# Patient Record
Sex: MALE | Race: BLACK OR AFRICAN AMERICAN | NOT HISPANIC OR LATINO | Employment: FULL TIME | URBAN - METROPOLITAN AREA
[De-identification: names, ages, dates, MRNs, and addresses within clinical notes are randomized per-mention and may not be internally consistent; named-entity substitution may affect disease eponyms.]

---

## 2022-11-21 PROCEDURE — 250N000013 HC RX MED GY IP 250 OP 250 PS 637: Performed by: EMERGENCY MEDICINE

## 2022-11-21 PROCEDURE — 99283 EMERGENCY DEPT VISIT LOW MDM: CPT

## 2022-11-21 RX ORDER — IBUPROFEN 600 MG/1
600 TABLET, FILM COATED ORAL ONCE
Status: COMPLETED | OUTPATIENT
Start: 2022-11-22 | End: 2022-11-21

## 2022-11-21 RX ADMIN — IBUPROFEN 600 MG: 600 TABLET ORAL at 23:59

## 2022-11-22 ENCOUNTER — HOSPITAL ENCOUNTER (EMERGENCY)
Facility: CLINIC | Age: 32
Discharge: HOME | End: 2022-11-22
Attending: EMERGENCY MEDICINE | Admitting: EMERGENCY MEDICINE
Payer: OTHER MISCELLANEOUS

## 2022-11-22 ENCOUNTER — APPOINTMENT (OUTPATIENT)
Dept: RADIOLOGY | Facility: CLINIC | Age: 32
End: 2022-11-22
Attending: EMERGENCY MEDICINE

## 2022-11-22 VITALS
SYSTOLIC BLOOD PRESSURE: 135 MMHG | TEMPERATURE: 98.5 F | OXYGEN SATURATION: 100 % | RESPIRATION RATE: 16 BRPM | HEART RATE: 70 BPM | DIASTOLIC BLOOD PRESSURE: 89 MMHG

## 2022-11-22 DIAGNOSIS — S80.12XA CONTUSION OF LEFT LOWER EXTREMITY, INITIAL ENCOUNTER: ICD-10-CM

## 2022-11-22 DIAGNOSIS — S84.92XA NEURAPRAXIA OF LEFT LOWER EXTREMITY, INITIAL ENCOUNTER: ICD-10-CM

## 2022-11-22 DIAGNOSIS — S80.812A ABRASION OF LEFT LEG, INITIAL ENCOUNTER: ICD-10-CM

## 2022-11-22 PROCEDURE — 73562 X-RAY EXAM OF KNEE 3: CPT | Mod: LT

## 2022-11-22 ASSESSMENT — ENCOUNTER SYMPTOMS: WOUND: 1

## 2022-11-22 ASSESSMENT — ACTIVITIES OF DAILY LIVING (ADL): ADLS_ACUITY_SCORE: 35

## 2022-11-22 NOTE — ED PROVIDER NOTES
EMERGENCY DEPARTMENT ENCOUNTER      NAME: Osman Davis  AGE: 32 year old male  YOB: 1990  MRN: [id number]  EVALUATION DATE & TIME: No admission date for patient encounter.    PCP: No Ref-Primary, Physician    ED PROVIDER: Toño Ames M.D.      Chief Complaint   Patient presents with     Leg Injury         FINAL IMPRESSION:  1. Abrasion of left leg, initial encounter    2. Contusion of left lower extremity, initial encounter    3. Neurapraxia of left lower extremity, initial encounter          ED COURSE & MEDICAL DECISION MAKING:    Pertinent Labs & Imaging studies reviewed. (See chart for details)  32 year old male presents to the Emergency Department for evaluation of left leg injury.  He had a large window fall onto his leg.  It was not broken and was not cut by the glass.  He has a small abrasion over his left lateral leg.  Wound is irrigated and cleaned.  Did do an x-ray.  No signs of fracture.  Compartments are soft.  No signs of compartment syndrome.  I suspect he has some degree of neuropraxia as he likely irritated the nerve there.  Discussed supportive care including Tylenol ibuprofen.  Did give him crutches at his request.  Will discharge home.    11:47 PM I met with the patient to gather history and to perform my initial exam. I discussed the plan for care while in the Emergency Department. PPE: surgical mask, goggles, gloves.  1:03 AM I updated the patient on X-ray results. We discussed plans for discharge including supportive cares, symptomatic treatment, outpatient follow up, and reasons to return to the emergency department.     At the conclusion of the encounter I discussed the results of all of the tests and the disposition. The questions were answered. The patient or family acknowledged understanding and was agreeable with the care plan.         MEDICATIONS GIVEN IN THE EMERGENCY:  Medications   ibuprofen (ADVIL/MOTRIN) tablet 600 mg (600 mg Oral Given 11/21/22 0957)       NEW  PRESCRIPTIONS STARTED AT TODAY'S ER VISIT  There are no discharge medications for this patient.         =================================================================    HPI    Patient information was obtained from: patient     Use of : N/A      Osman Davis is a 32 year old male with no pertinent history who presents to this ED by walk in for evaluation of leg injury.    Patient was at work when a window fell on him. The window was not broken when it fell. Reports left knee pain with a cut on the left lateral side. Has not been able to walk since the accident. Denies any other complaints.    Otherwise healthy.     REVIEW OF SYSTEMS   Review of Systems   Musculoskeletal:        Positive for left knee pain.   Skin: Positive for wound (left lateral knee).   All other systems reviewed and are negative.       PAST MEDICAL HISTORY:  History reviewed. No pertinent past medical history.    PAST SURGICAL HISTORY:  History reviewed. No pertinent surgical history.        CURRENT MEDICATIONS:    No current facility-administered medications for this encounter.     No current outpatient medications on file.         ALLERGIES:  No Known Allergies    FAMILY HISTORY:  No family history on file.    SOCIAL HISTORY:   Social History     Socioeconomic History     Marital status: Single     11/22/2022 Patient alone in the ED.    VITALS:  /89   Pulse 70   Temp 98.5  F (36.9  C) (Oral)   Resp 16   SpO2 100%     PHYSICAL EXAM    Physical Exam  Constitutional:       General: He is not in acute distress.     Appearance: He is not diaphoretic.   HENT:      Head: Atraumatic.      Mouth/Throat:      Mouth: Oropharynx is clear and moist.   Eyes:      General: No scleral icterus.     Pupils: Pupils are equal, round, and reactive to light.   Cardiovascular:      Pulses: Intact distal pulses.      Heart sounds: Normal heart sounds.   Pulmonary:      Effort: No respiratory distress.      Breath sounds: Normal breath sounds.    Abdominal:      General: Bowel sounds are normal.      Palpations: Abdomen is soft.      Tenderness: There is no abdominal tenderness.   Musculoskeletal:         General: Tenderness and signs of injury present. No edema.      Comments: Abrasion over left lateral shin just distal to the knee.  There is some muscular tenderness there.  Compartments are soft.  Pulses intact.  Some diminished sensation to light touch in the foot.  Cap refill less than 3 seconds.  Strength intact.  Able to ambulate though with some pain.  No bony tenderness.   Skin:     General: Skin is warm.      Findings: No rash.           RADIOLOGY:  Reviewed all pertinent imaging. Please see official radiology report.  XR Knee Left 3 Views   Final Result   IMPRESSION:    1. No visualized acute fracture or malalignment of the left knee.   2. No left knee joint effusion.           I, Terri Escalera, am serving as a scribe to document services personally performed by Dr. Toño Ames, based on my observation and the provider's statements to me. I, Toño Ames MD attest that Terri Escalera is acting in a scribe capacity, has observed my performance of the services and has documented them in accordance with my direction.    Toño Ames M.D.  Emergency Medicine  [facility] EMERGENCY ROOM  [address] [id number] [phone]  Dept: [phone]       Toño Ames MD  11/22/22 0133

## 2022-11-22 NOTE — Clinical Note
Osman Davis was seen and treated in our emergency department on 11/21/2022.  He may return to work on 11/25/2022.       If you have any questions or concerns, please don't hesitate to call.      Toño Ames MD

## 2022-11-22 NOTE — ED NOTES
"Patient reports he was at work when a window fell on his left lower leg . Scrape noted to left lateral leg near knee. ROM intact at toes, ankle, and knee. Patient reports \"numbness\" to LLE below knee  "

## 2022-11-22 NOTE — ED TRIAGE NOTES
Reports lle injury and lac approx 2 hrs ago  Reports was at work and window fell on leg  Reporting numbness/decreased sensation from knee to foot  Wound is wrapped, no active bleeding  Pedal pulses palp to lle       Triage Assessment     Row Name 11/21/22 2234       Triage Assessment (Adult)    Airway WDL WDL       Respiratory WDL    Respiratory WDL WDL       Skin Circulation/Temperature WDL    Skin Circulation/Temperature WDL WDL       Cardiac WDL    Cardiac WDL WDL       Peripheral/Neurovascular WDL    Peripheral Neurovascular WDL X;neurovascular assessment lower       LLE Neurovascular Assessment    Temperature LLE cool    Color LLE no discoloration    Sensation LLE tenderness present;numbness present  pedal pulses palpable       Cognitive/Neuro/Behavioral WDL    Cognitive/Neuro/Behavioral WDL WDL